# Patient Record
Sex: MALE | Race: BLACK OR AFRICAN AMERICAN | NOT HISPANIC OR LATINO | URBAN - METROPOLITAN AREA
[De-identification: names, ages, dates, MRNs, and addresses within clinical notes are randomized per-mention and may not be internally consistent; named-entity substitution may affect disease eponyms.]

---

## 2020-08-21 ENCOUNTER — EMERGENCY (EMERGENCY)
Facility: HOSPITAL | Age: 40
LOS: 0 days | Discharge: HOME | End: 2020-08-21
Payer: COMMERCIAL

## 2020-08-21 VITALS
OXYGEN SATURATION: 99 % | WEIGHT: 139.99 LBS | HEART RATE: 83 BPM | RESPIRATION RATE: 19 BRPM | HEIGHT: 70 IN | SYSTOLIC BLOOD PRESSURE: 112 MMHG | TEMPERATURE: 98 F | DIASTOLIC BLOOD PRESSURE: 53 MMHG

## 2020-08-21 DIAGNOSIS — G43.909 MIGRAINE, UNSPECIFIED, NOT INTRACTABLE, WITHOUT STATUS MIGRAINOSUS: ICD-10-CM

## 2020-08-21 DIAGNOSIS — S92.512A DISPLACED FRACTURE OF PROXIMAL PHALANX OF LEFT LESSER TOE(S), INITIAL ENCOUNTER FOR CLOSED FRACTURE: ICD-10-CM

## 2020-08-21 DIAGNOSIS — F43.10 POST-TRAUMATIC STRESS DISORDER, UNSPECIFIED: ICD-10-CM
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
## 2020-08-21 DIAGNOSIS — M79.675 PAIN IN LEFT TOE(S): ICD-10-CM

## 2020-08-21 DIAGNOSIS — Z91.013 ALLERGY TO SEAFOOD: ICD-10-CM
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
## 2020-08-21 PROCEDURE — 73620 X-RAY EXAM OF FOOT: CPT | Mod: 26,LT

## 2020-08-21 PROCEDURE — 28510 TREATMENT OF TOE FRACTURE: CPT | Mod: 54,T2

## 2020-08-21 PROCEDURE — 99284 EMERGENCY DEPT VISIT MOD MDM: CPT | Mod: 57

## 2020-08-21 RX ORDER — ACETAMINOPHEN 500 MG
650 TABLET ORAL ONCE
Refills: 0 | Status: COMPLETED | OUTPATIENT
Start: 2020-08-21 | End: 2020-08-21

## 2020-08-21 RX ORDER — IBUPROFEN 200 MG
600 TABLET ORAL ONCE
Refills: 0 | Status: COMPLETED | OUTPATIENT
Start: 2020-08-21 | End: 2020-08-21

## 2020-08-21 RX ORDER — LURASIDONE HYDROCHLORIDE 40 MG/1
1 TABLET ORAL
Qty: 0 | Refills: 0 | DISCHARGE

## 2020-08-21 RX ORDER — GABAPENTIN 400 MG/1
1 CAPSULE ORAL
Qty: 0 | Refills: 0 | DISCHARGE

## 2020-08-21 RX ORDER — HYDROCODONE BITARTRATE 50 MG/1
0 CAPSULE, EXTENDED RELEASE ORAL
Qty: 0 | Refills: 0 | DISCHARGE

## 2020-08-21 RX ADMIN — Medication 650 MILLIGRAM(S): at 19:41

## 2020-08-21 RX ADMIN — Medication 600 MILLIGRAM(S): at 19:41

## 2020-08-21 NOTE — ED PROVIDER NOTE - NSFOLLOWUPINSTRUCTIONS_ED_ALL_ED_FT
Toe Fracture  A toe fracture is a break in one of the toe bones (phalanges). A toe fracture may be:  A crack in the surface of the bone (stress fracture). This often occurs in athletes.A break all the way through the bone (complete fracture).What are the causes?  This condition may be caused by:  Direct impact, such as from dropping a heavy object on your toe.Stubbing your toe.Twisting or stretching your toe out of place.Overuse or repetitive exercise.What increases the risk?  You are more likely to develop this condition if you:  Play contact sports.Have a condition that causes the bones to become thin and brittle (osteoporosis).Have a low calcium level.What are the signs or symptoms?  The main symptoms of this condition are swelling and pain in the toe. Other symptoms include:  Bruising.Stiffness.Numbness.A change in the way the toe looks.Broken bones that poke through the skin.Blood beneath the toenail.How is this diagnosed?  This condition is diagnosed with a physical exam. You may also have X-rays.  How is this treated?  Treatment for this condition depends on the type of fracture and its severity. Treatment may include:  Taping the broken toe to a toe that is next to it (collins taping). This is the most common treatment for fractures in which the bone has not moved out of place (non-displaced fracture).Wearing a shoe that has a wide, rigid sole to protect the toe and to limit its movement.Wearing a walking cast.Having a procedure to move the toe back into place.Surgery. This may be needed if the:  Pieces of broken bone are out of place (displaced).Bone breaks through the skin.Physical therapy. This is done to help regain movement and strength in the toe.You may need follow-up X-rays to make sure that the bone is healing well and staying in position.  Follow these instructions at home:  If you have a shoe:     Wear the shoe as told by your health care provider. Remove it only as told by your health care provider. Loosen the shoe if your toes tingle, become numb, or turn cold and blue.Keep the shoe clean and dry.If you have a cast:     Do not put pressure on any part of the cast until it is fully hardened. This may take several hours.Do not stick anything inside the cast to scratch your skin. Doing that increases your risk of infection.Check the skin around the cast every day. Tell your health care provider about any concerns. You may put lotion on dry skin around the edges of the cast. Do not put lotion on the skin underneath the cast. Keep the cast clean and dry.Bathing     Do not take baths, swim, or use a hot tub until your health care provider approves. Ask your health care provider if you can take showers.If the shoe or cast is not waterproof:  Do not let it get wet. Cover it with a watertight covering when you take a bath or a shower.Activity     Do not use the injured foot to support your body weight until your health care provider says that you can. Use crutches as directed.Ask your health care provider:  What activities are safe for you during recovery.What activities you need to avoid.Do physical therapy exercises as directed.Driving     Do not drive or use heavy machinery while taking pain medicine.Do not drive while wearing a cast on a foot that you use for driving.Managing pain, stiffness, and swelling        If directed, put ice on painful areas:  Put ice in a plastic bag.Place a towel between your skin and the bag.  If you have a shoe, remove it as told by your health care provider.If you have a cast, place a towel between your cast and the bag.Leave the ice on for 20 minutes, 2–3 times per day.Raise (elevate) the injured area above the level of your heart while you are sitting or lying down.General instructions     If your toe was treated with collins taping, follow your health care provider's instructions for changing the gauze and tape. Change it more often if:  The gauze and tape get wet. If this happens, dry the space between the toes.The gauze and tape are too tight and cause your toe to become pale or numb.If you were not given a protective shoe, wear sturdy, supportive shoes. Your shoes should not pinch your toes and should not fit tightly against your toes.Do not use any products that contain nicotine or tobacco, such as cigarettes and e-cigarettes. These can delay bone healing. If you need help quitting, ask your health care provider.Take over-the-counter and prescription medicines only as told by your health care provider.Keep all follow-up visits as told by your health care provider. This is important.Contact a health care provider if you have:  Pain that gets worse or does not get better with medicine.A fever.A bad smell coming from your cast.Get help right away if you have:  Any of the following in your toes or your foot:  Numbness that gets worse.Tingling.Coldness.Blue skin.Redness or swelling that gets worse.Pain that suddenly becomes severe.Summary  A toe fracture is a break in one of the toe bones (phalanges).Treatment depends on how severe your fracture is and how the pieces of the broken bone line up with each other. Treatment may include collins taping, wearing a shoe or a cast, or using crutches.Ice and elevate your foot to help lessen the pain and swelling.This information is not intended to replace advice given to you by your health care provider. Make sure you discuss any questions you have with your health care provider.

## 2020-08-21 NOTE — ED PROVIDER NOTE - PATIENT PORTAL LINK FT
You can access the FollowMyHealth Patient Portal offered by [facility] by registering at the following website: [email or web address]. By joining [employer]’s FollowMyHealth portal, you will also be able to view your health information using other applications (apps) compatible with our system.

## 2020-08-21 NOTE — ED ADULT NURSE NOTE - NSIMPLEMENTINTERV_GEN_ALL_ED
Implemented All Universal Safety Interventions:  [place] to call system. Call bell, personal items and telephone within reach. Instruct patient to call for assistance. Room bathroom lighting operational. Non-slip footwear when patient is off stretcher. Physically safe environment: no spills, clutter or unnecessary equipment. Stretcher in lowest position, wheels locked, appropriate side rails in place.

## 2020-08-21 NOTE — ED PROVIDER NOTE - OBJECTIVE STATEMENT
41 yo M with PMHx PTSD presents c/o L 3rd toe pain, pt states he was lifting a bed when his toe got caught on something and twisted, now c/o pain, pt denies any other injuries,

## 2020-08-21 NOTE — ED PROVIDER NOTE - MUSCULOSKELETAL, MLM
+TTP proximal 3rd toe, cap refill <2 seconds, NVI, pedal pulse 2+, Spine appears normal, range of motion is not limited, no muscle or joint tenderness

## 2020-08-21 NOTE — ED ADULT NURSE NOTE - OBJECTIVE STATEMENT
Patient presents in ED complaining of pain after bed fell on the 3rd toe of left foot this afternoon, pain 6/10

## 2020-08-21 NOTE — ED PROVIDER NOTE - CARE PLAN
Principal Discharge DX:	Closed displaced fracture of proximal phalanx of lesser toe of left foot, initial encounter

## 2023-04-28 NOTE — ED ADULT NURSE NOTE - CAS DISCH BELONGINGS RETURNED
"Recently seen by speech therapy   Esophagram revealed: \"No evidence for esophageal obstruction or stricture  Significant spontaneous gastroesophageal reflux to the level of the upper esophagus  Tertiary contractions throughout the esophagus consistent with some degree of impaired motility  \"  · Continue dysphagia level 2, chopped foods with thin liquids  · Protonix 40 mg daily for possible dysmotility   · Aspiration precautions   " Not applicable

## 2023-09-05 NOTE — ED PROVIDER NOTE - CROS ED MARK PERT SYS NEG
Transitional Care Management Telephone Call Attempt    Discharge Date: 9/1/23  Discharge Location: [facility]: [facility]    Call Attempt Date: 9/5/2023  Call Attempt: First         Upon return call, please reach out to RN staff via secure chat group: [facility] [facility] [email or web address] RN CALLBACK         lupillo all pertinent systems negative